# Patient Record
Sex: FEMALE | Race: OTHER | NOT HISPANIC OR LATINO | URBAN - METROPOLITAN AREA
[De-identification: names, ages, dates, MRNs, and addresses within clinical notes are randomized per-mention and may not be internally consistent; named-entity substitution may affect disease eponyms.]

---

## 2019-04-17 ENCOUNTER — OUTPATIENT (OUTPATIENT)
Dept: OUTPATIENT SERVICES | Facility: HOSPITAL | Age: 49
LOS: 1 days | End: 2019-04-17
Payer: COMMERCIAL

## 2019-04-17 VITALS
SYSTOLIC BLOOD PRESSURE: 119 MMHG | OXYGEN SATURATION: 99 % | HEART RATE: 66 BPM | WEIGHT: 151.68 LBS | HEIGHT: 61.5 IN | RESPIRATION RATE: 16 BRPM | DIASTOLIC BLOOD PRESSURE: 82 MMHG | TEMPERATURE: 98 F

## 2019-04-17 DIAGNOSIS — R92.2 INCONCLUSIVE MAMMOGRAM: ICD-10-CM

## 2019-04-17 DIAGNOSIS — Z80.3 FAMILY HISTORY OF MALIGNANT NEOPLASM OF BREAST: ICD-10-CM

## 2019-04-17 DIAGNOSIS — Z86.018 PERSONAL HISTORY OF OTHER BENIGN NEOPLASM: ICD-10-CM

## 2019-04-17 DIAGNOSIS — Z98.890 OTHER SPECIFIED POSTPROCEDURAL STATES: Chronic | ICD-10-CM

## 2019-04-17 DIAGNOSIS — Z01.818 ENCOUNTER FOR OTHER PREPROCEDURAL EXAMINATION: ICD-10-CM

## 2019-04-17 DIAGNOSIS — Z98.891 HISTORY OF UTERINE SCAR FROM PREVIOUS SURGERY: Chronic | ICD-10-CM

## 2019-04-17 LAB
HCG SERPL-ACNC: <1 MIU/ML — SIGNIFICANT CHANGE UP
HCT VFR BLD CALC: 40.5 % — SIGNIFICANT CHANGE UP (ref 34.5–45)
HGB BLD-MCNC: 13 G/DL — SIGNIFICANT CHANGE UP (ref 11.5–15.5)
MCHC RBC-ENTMCNC: 29.4 PG — SIGNIFICANT CHANGE UP (ref 27–34)
MCHC RBC-ENTMCNC: 32 GM/DL — SIGNIFICANT CHANGE UP (ref 32–36)
MCV RBC AUTO: 91.9 FL — SIGNIFICANT CHANGE UP (ref 80–100)
PLATELET # BLD AUTO: 263 K/UL — SIGNIFICANT CHANGE UP (ref 150–400)
RBC # BLD: 4.41 M/UL — SIGNIFICANT CHANGE UP (ref 3.8–5.2)
RBC # FLD: 12.6 % — SIGNIFICANT CHANGE UP (ref 10.3–14.5)
WBC # BLD: 10 K/UL — SIGNIFICANT CHANGE UP (ref 3.8–10.5)
WBC # FLD AUTO: 10 K/UL — SIGNIFICANT CHANGE UP (ref 3.8–10.5)

## 2019-04-17 PROCEDURE — 84702 CHORIONIC GONADOTROPIN TEST: CPT

## 2019-04-17 PROCEDURE — 85027 COMPLETE CBC AUTOMATED: CPT

## 2019-04-17 PROCEDURE — G0463: CPT

## 2019-04-17 PROCEDURE — 36415 COLL VENOUS BLD VENIPUNCTURE: CPT

## 2019-04-17 NOTE — H&P PST ADULT - NSICDXPROBLEM_GEN_ALL_CORE_FT
PROBLEM DIAGNOSES  Problem: Personal history of other benign neoplasm  Assessment and Plan: Right Breast Excisional Biopsy w/Mamm Localization Mastectomy scheduled for 4/19/2019.  Pre-op instructions given. Pt verbalized understanding  Pepcid given for GI prophylaxis  Chlorhexidine wash instructions given  Pt instructed to stop ASA, NSAIDs, MVI & Herbal supplements 7 days before procedure PROBLEM DIAGNOSES  Problem: Personal history of other benign neoplasm  Assessment and Plan: Right Breast Excisional Biopsy w/Mamm Localization Mastectomy scheduled for 4/19/2019.  Pre-op instructions given. Pt verbalized understanding  Chlorhexidine wash instructions given  Pt instructed to stop ASA, NSAIDs, MVI & Herbal supplements 7 days before procedure    Note: Pt reports she is unable to swallow pills - Pepcid not given

## 2019-04-17 NOTE — H&P PST ADULT - MUSCULOSKELETAL
negative detailed exam ROM intact/no joint warmth/normal strength/no joint swelling/no joint erythema/no calf tenderness

## 2019-04-17 NOTE — H&P PST ADULT - PRESSURE ULCER(S)
Chief complaint:   Chief Complaint   Patient presents with   • Physical       Vitals:  Visit Vitals  BP 98/72   Ht 5' 10\" (1.778 m)   Wt 79.9 kg   SpO2 97%   BMI 25.28 kg/m²       HISTORY OF PRESENT ILLNESS     Patient presents today for a cpe. Has no new concerns at this time.  DTaP/Tdap/Td Vaccine(1 - Tdap) due on 07/28/1998  Influenza Vaccine(1) due on 08/01/2018    Patient is due for topics as listed above but declines Immunization(s) Influenza at this time.                Other significant problems:  Patient Active Problem List    Diagnosis Date Noted   • Anxiety 12/04/2017     Priority: Low   • Fissure, anal 09/28/2017     Priority: Low   • Gastroesophageal reflux disease without esophagitis 09/28/2017     Priority: Low       PAST MEDICAL, FAMILY AND SOCIAL HISTORY     Medications:  Current Outpatient Medications   Medication   • sertraline (ZOLOFT) 50 MG tablet   • Melatonin 1 MG Tab   • vitamin - therapeutic multivitamins w/minerals (CENTRUM SILVER,THERA-M) Tab   • cetirizine (ZYRTEC) 10 MG tablet     No current facility-administered medications for this visit.        Allergies:  ALLERGIES:   Allergen Reactions   • Penicillins HIVES   • Sulfa Antibiotics        Past Medical  History/Surgeries:  Past Medical History:   Diagnosis Date   • Allergy    • PMH of     right inguinal hernia   • RAD (reactive airway disease)     mild       Past Surgical History:   Procedure Laterality Date   • Hernia repair     • Hip surgery      right   • Past surgical history  09/18/09    None       Family History:  Family History   Problem Relation Age of Onset   • Diabetes Father    • High blood pressure Father    • * Daughter         tuberlous schlerosis       Social History:  Social History     Tobacco Use   • Smoking status: Never Smoker   • Smokeless tobacco: Never Used   Substance Use Topics   • Alcohol use: Yes     Alcohol/week: 4.2 oz     Types: 7 Cans of beer per week       REVIEW OF SYSTEMS     Review of Systems    Constitutional: Negative for chills, fatigue and fever.   HENT: Negative for congestion, ear pain, sinus pressure, sinus pain and sore throat.    Respiratory: Negative for cough, choking, shortness of breath and wheezing.    Cardiovascular: Negative for chest pain and palpitations.   Gastrointestinal: Negative for abdominal pain, blood in stool, constipation and diarrhea.   Endocrine: Negative for polydipsia, polyphagia and polyuria.   Genitourinary: Negative for difficulty urinating, dysuria, frequency and urgency.   Musculoskeletal: Negative for arthralgias, myalgias and neck pain.   Skin: Negative for color change, pallor and rash.   Allergic/Immunologic: Negative for environmental allergies and food allergies.   Neurological: Negative for dizziness, light-headedness and headaches.   Hematological: Does not bruise/bleed easily.   Psychiatric/Behavioral: Negative for agitation and sleep disturbance. The patient is not nervous/anxious.        PHYSICAL EXAM     Physical Exam   Constitutional: He is oriented to person, place, and time. He appears well-developed and well-nourished. No distress.   HENT:   Head: Normocephalic and atraumatic.   Right Ear: Tympanic membrane, external ear and ear canal normal.   Left Ear: Tympanic membrane, external ear and ear canal normal.   Nose: Nose normal. No mucosal edema or rhinorrhea. Right sinus exhibits no maxillary sinus tenderness and no frontal sinus tenderness. Left sinus exhibits no maxillary sinus tenderness and no frontal sinus tenderness.   Mouth/Throat: Uvula is midline, oropharynx is clear and moist and mucous membranes are normal. No oropharyngeal exudate.   Eyes: Conjunctivae, EOM and lids are normal. Pupils are equal, round, and reactive to light. Right eye exhibits no discharge. Left eye exhibits no discharge. No scleral icterus.   Neck: Trachea normal and normal range of motion. Neck supple. No JVD present. Carotid bruit is not present. No tracheal deviation  present. No thyroid mass and no thyromegaly present.   Cardiovascular: Normal rate, regular rhythm, S1 normal, S2 normal, normal heart sounds and intact distal pulses. Exam reveals no gallop, no S3, no S4 and no friction rub.   No murmur heard.  Pulmonary/Chest: Effort normal and breath sounds normal. No stridor. No respiratory distress. He has no wheezes. He has no rales. He exhibits no tenderness.   Abdominal: Soft. Bowel sounds are normal. He exhibits no distension and no mass. There is no hepatosplenomegaly. There is no tenderness. There is no rebound, no guarding and no CVA tenderness. No hernia.   Musculoskeletal: Normal range of motion. He exhibits no edema or tenderness.   Lymphadenopathy:     He has no cervical adenopathy.        Right: No supraclavicular adenopathy present.        Left: No supraclavicular adenopathy present.   Neurological: He is alert and oriented to person, place, and time. He displays normal reflexes. No cranial nerve deficit. He exhibits normal muscle tone. Coordination normal.   Skin: Skin is warm and dry. No rash noted. He is not diaphoretic. No erythema. No pallor.   Psychiatric: He has a normal mood and affect. His speech is normal and behavior is normal. Judgment and thought content normal. Cognition and memory are normal.   Nursing note and vitals reviewed.      ASSESSMENT/PLAN     Jeffrey Velasquez is a 41 year old male wiHT:  (Z00.00) Routine general medical examination at a health care facility  (primary encounter diagnosis)  Comment: Routine cares discussed. Self safety, safe sex, diet, exercise, heart healthy lifestyle.   Plan: see below - discussed hdl was great last year. Ok to hold off on recheck x 3-5 years    (Z23) Need for vaccination  Comment: routien  Plan: TETANUS DIPHTHERIA ACELLULAR PERTUSSIS VACC,         10+ YRS (BOOSTRIX)          (F41.9) Anxiety  Comment: on zoloft  Plan: cont    Return in about 1 year (around 12/6/2019).          no

## 2019-04-17 NOTE — H&P PST ADULT - NSANTHOSAYNRD_GEN_A_CORE
neck 13inches/No. RISA screening performed.  STOP BANG Legend: 0-2 = LOW Risk; 3-4 = INTERMEDIATE Risk; 5-8 = HIGH Risk

## 2019-04-17 NOTE — H&P PST ADULT - HISTORY OF PRESENT ILLNESS
47yo female denies significant medical history, reports recent mammogram showed right breast mass, benign results. Pt presents today for presurgical testing for Right Breast Excisional Biopsy w/Mamm Localization Mastectomy scheduled for 4/19/2019.

## 2019-04-17 NOTE — H&P PST ADULT - NEGATIVE BREAST SYMPTOMS
no breast tenderness R/no breast lump R/no breast tenderness L/no nipple discharge L/no nipple discharge R

## 2019-04-18 ENCOUNTER — TRANSCRIPTION ENCOUNTER (OUTPATIENT)
Age: 49
End: 2019-04-18

## 2019-04-19 ENCOUNTER — RESULT REVIEW (OUTPATIENT)
Age: 49
End: 2019-04-19

## 2019-04-19 ENCOUNTER — OUTPATIENT (OUTPATIENT)
Dept: OUTPATIENT SERVICES | Facility: HOSPITAL | Age: 49
LOS: 1 days | End: 2019-04-19
Payer: COMMERCIAL

## 2019-04-19 VITALS
OXYGEN SATURATION: 99 % | HEART RATE: 69 BPM | TEMPERATURE: 97 F | SYSTOLIC BLOOD PRESSURE: 106 MMHG | RESPIRATION RATE: 16 BRPM | DIASTOLIC BLOOD PRESSURE: 71 MMHG

## 2019-04-19 VITALS
RESPIRATION RATE: 18 BRPM | HEART RATE: 79 BPM | DIASTOLIC BLOOD PRESSURE: 72 MMHG | OXYGEN SATURATION: 100 % | TEMPERATURE: 98 F | HEIGHT: 61.5 IN | SYSTOLIC BLOOD PRESSURE: 119 MMHG | WEIGHT: 151.68 LBS

## 2019-04-19 DIAGNOSIS — Z80.3 FAMILY HISTORY OF MALIGNANT NEOPLASM OF BREAST: ICD-10-CM

## 2019-04-19 DIAGNOSIS — Z86.018 PERSONAL HISTORY OF OTHER BENIGN NEOPLASM: ICD-10-CM

## 2019-04-19 DIAGNOSIS — Z98.890 OTHER SPECIFIED POSTPROCEDURAL STATES: Chronic | ICD-10-CM

## 2019-04-19 DIAGNOSIS — R92.2 INCONCLUSIVE MAMMOGRAM: ICD-10-CM

## 2019-04-19 DIAGNOSIS — Z98.891 HISTORY OF UTERINE SCAR FROM PREVIOUS SURGERY: Chronic | ICD-10-CM

## 2019-04-19 PROCEDURE — 76098 X-RAY EXAM SURGICAL SPECIMEN: CPT

## 2019-04-19 PROCEDURE — ZZZZZ: CPT

## 2019-04-19 PROCEDURE — C1889: CPT

## 2019-04-19 PROCEDURE — 19125 EXCISION BREAST LESION: CPT | Mod: RT

## 2019-04-19 PROCEDURE — 19281 PERQ DEVICE BREAST 1ST IMAG: CPT

## 2019-04-19 PROCEDURE — 88305 TISSUE EXAM BY PATHOLOGIST: CPT | Mod: 26

## 2019-04-19 PROCEDURE — 88305 TISSUE EXAM BY PATHOLOGIST: CPT

## 2019-04-19 RX ORDER — SODIUM CHLORIDE 9 MG/ML
1000 INJECTION, SOLUTION INTRAVENOUS
Qty: 0 | Refills: 0 | Status: DISCONTINUED | OUTPATIENT
Start: 2019-04-19 | End: 2019-04-19

## 2019-04-19 RX ORDER — ONDANSETRON 8 MG/1
4 TABLET, FILM COATED ORAL ONCE
Qty: 0 | Refills: 0 | Status: DISCONTINUED | OUTPATIENT
Start: 2019-04-19 | End: 2019-04-19

## 2019-04-19 RX ORDER — HYDROMORPHONE HYDROCHLORIDE 2 MG/ML
1 INJECTION INTRAMUSCULAR; INTRAVENOUS; SUBCUTANEOUS
Qty: 0 | Refills: 0 | Status: DISCONTINUED | OUTPATIENT
Start: 2019-04-19 | End: 2019-04-19

## 2019-04-19 RX ORDER — HYDROMORPHONE HYDROCHLORIDE 2 MG/ML
0.5 INJECTION INTRAMUSCULAR; INTRAVENOUS; SUBCUTANEOUS
Qty: 0 | Refills: 0 | Status: DISCONTINUED | OUTPATIENT
Start: 2019-04-19 | End: 2019-04-19

## 2019-04-19 RX ADMIN — SODIUM CHLORIDE 50 MILLILITER(S): 9 INJECTION, SOLUTION INTRAVENOUS at 07:39

## 2019-04-19 NOTE — ASU DISCHARGE PLAN (ADULT/PEDIATRIC) - NURSING INSTRUCTIONS
all discharge safety follow up care to MD. Take all medication with a food item . Eat lightly avoid heavy and spicy foods. Rest keep head of bed elevated.

## 2019-04-19 NOTE — ASU DISCHARGE PLAN (ADULT/PEDIATRIC) - ASU DC SPECIAL INSTRUCTIONSFT
discharge from [facility] when meets criteria   follow up with Dr Virgen as discussed   keep dressing dry x 48 hours , then keep steri strips in place   take pain medication as prescribed by the office

## 2019-04-19 NOTE — ASU DISCHARGE PLAN (ADULT/PEDIATRIC) - CALL YOUR DOCTOR IF YOU HAVE ANY OF THE FOLLOWING:
Bleeding that does not stop Bleeding that does not stop/Swelling that gets worse/Fever greater than (need to indicate Fahrenheit or Celsius)/Inability to tolerate liquids or foods/Pain not relieved by Medications/Nausea and vomiting that does not stop/Wound/Surgical Site with redness, or foul smelling discharge or pus

## 2019-04-26 LAB — SURGICAL PATHOLOGY STUDY: SIGNIFICANT CHANGE UP

## 2020-04-07 PROBLEM — N63.0 UNSPECIFIED LUMP IN UNSPECIFIED BREAST: Chronic | Status: ACTIVE | Noted: 2019-04-17

## 2020-06-30 ENCOUNTER — APPOINTMENT (OUTPATIENT)
Dept: OBGYN | Facility: CLINIC | Age: 50
End: 2020-06-30
Payer: COMMERCIAL

## 2020-06-30 VITALS
WEIGHT: 149.13 LBS | SYSTOLIC BLOOD PRESSURE: 118 MMHG | DIASTOLIC BLOOD PRESSURE: 70 MMHG | BODY MASS INDEX: 27.44 KG/M2 | HEIGHT: 62 IN

## 2020-06-30 DIAGNOSIS — Z00.00 ENCOUNTER FOR GENERAL ADULT MEDICAL EXAMINATION W/OUT ABNORMAL FINDINGS: ICD-10-CM

## 2020-06-30 DIAGNOSIS — Z80.3 FAMILY HISTORY OF MALIGNANT NEOPLASM OF BREAST: ICD-10-CM

## 2020-06-30 DIAGNOSIS — N60.99 UNSPECIFIED BENIGN MAMMARY DYSPLASIA OF UNSPECIFIED BREAST: ICD-10-CM

## 2020-06-30 DIAGNOSIS — Z01.419 ENCOUNTER FOR GYNECOLOGICAL EXAMINATION (GENERAL) (ROUTINE) W/OUT ABNORMAL FINDINGS: ICD-10-CM

## 2020-06-30 DIAGNOSIS — Z82.49 FAMILY HISTORY OF ISCHEMIC HEART DISEASE AND OTHER DISEASES OF THE CIRCULATORY SYSTEM: ICD-10-CM

## 2020-06-30 DIAGNOSIS — Z78.9 OTHER SPECIFIED HEALTH STATUS: ICD-10-CM

## 2020-06-30 PROCEDURE — 99396 PREV VISIT EST AGE 40-64: CPT

## 2020-06-30 NOTE — PHYSICAL EXAM
[Awake] : awake [Alert] : alert [Mass] : no breast mass [Acute Distress] : no acute distress [Axillary LAD] : no axillary lymphadenopathy [Nipple Discharge] : no nipple discharge [Soft] : soft [Tender] : non tender [Oriented x3] : oriented to person, place, and time [No Bleeding] : there was no active vaginal bleeding [Normal] : uterus [Uterine Adnexae] : were not tender and not enlarged [No Tenderness] : no rectal tenderness [Nl Sphincter Tone] : normal sphincter tone

## 2020-06-30 NOTE — HISTORY OF PRESENT ILLNESS
[Menarche Age: ____] : age at menarche was [unfilled] [Regular Cycle Intervals] : periods have been irregular [Contraception] : uses contraception [Sexually Active] : is sexually active [Partner Vasectomy] : has a partner with a vasectomy

## 2020-07-06 LAB
CYTOLOGY CVX/VAG DOC THIN PREP: NORMAL
HPV HIGH+LOW RISK DNA PNL CVX: NOT DETECTED

## 2020-12-23 PROBLEM — Z01.419 ENCOUNTER FOR GYNECOLOGICAL EXAMINATION: Status: RESOLVED | Noted: 2020-06-30 | Resolved: 2020-12-23

## 2024-10-10 NOTE — ASU PATIENT PROFILE, ADULT - NSSUBSTANCEUSE_GEN_ALL_CORE_SD
Addended by: BRYAN DIAZ on: 10/10/2024 04:48 PM     Modules accepted: Orders     caffeine/denies street drug use